# Patient Record
Sex: FEMALE
[De-identification: names, ages, dates, MRNs, and addresses within clinical notes are randomized per-mention and may not be internally consistent; named-entity substitution may affect disease eponyms.]

---

## 2018-06-18 ENCOUNTER — HOSPITAL ENCOUNTER (OUTPATIENT)
Dept: HOSPITAL 68 - STS | Age: 48
End: 2018-06-18
Attending: SURGERY
Payer: COMMERCIAL

## 2018-06-18 VITALS — BODY MASS INDEX: 35.08 KG/M2 | HEIGHT: 63 IN | WEIGHT: 198 LBS

## 2018-06-18 DIAGNOSIS — E66.9: ICD-10-CM

## 2018-06-18 DIAGNOSIS — K21.9: ICD-10-CM

## 2018-06-18 DIAGNOSIS — K80.10: Primary | ICD-10-CM

## 2018-06-18 NOTE — OPERATIVE REPORT
Operative/Inv Procedure Report
Surgery Date: 06/18/18
Name of Procedure:
Laparoscopic cholecystectomy
Pre-Operative Diagnosis:
Biliary colic
Post-Operative Diagnosis:
Chronic cholecystitis
Estimated Blood Loss: scant
Surgeon/Assistant:
Dorian MORENO,Joni BURDEN/Delmi NUR
 
Anesthesia: general endotracheal tube
Specimens:
Gallbladder
 
Operative/Procedure Note
Note:
After informed consent patient is brought to the operating room and laid supine.
 General anesthesia was obtained and her abdomen was prepped and draped.  The 
skin above the umbilicus infiltrated with local anesthesia and a curvilinear 
incision made sharply.  We came down through the subcutaneous tissues bluntly 
and grasped the fascia with Upsala's.  A fasciotomy was created sharply and stay 
sutures placed.  The peritoneum was entered sharply and a blunt Patel port was 
placed.  Pneumoperitoneum was achieved.  3, 5 mm ports were placed in the 
epigastrium and right upper quadrant after local anesthesia was instilled and 
under direct vision the camera.  She's placed in reverse Trendelenburg and 
rotated towards the left.  The gallbladder is identified.  The gallbladder is 
chronically inflamed and wall was very thickened.  It was grasped at the dome 
and retracted towards the head.  Infundibulum was then grasped.  Adhesions to 
the undersurface were taken down with blunt and cautery dissection.  We 
dissected both sides the triangle Calot peritoneal tissue with cautery.  The 
artery was medial and its normal anatomic position.  It was cauterized medially 
to allow it to be mobilized away from the duct.  There is a large lymph node 
that was dissected free to aid in tissue sedation.  Triangle was cleared of 
areolar tissue with cautery.  The arteries and duct were doubly ligated with 
clips.   Gallbladder is removed from the fossa electrocautery.  It was placed in
Endo Catch bag and cinched up.  Right upper quadrant was and suction irrigated 
normal saline.  Hemostasis achieved with cautery.  The ports were then removed 
and the gallbladder delivered and passed off the field.  The fascia was closed 
with 0 Vicryl suture.  Skin incisions closed with 4-0 Vicryl.  Steri-Strips and 
sterile dressing applied.  Sponge and needle counts are correct.
CC:
Jai MORENO,Edwina